# Patient Record
Sex: FEMALE | Race: AMERICAN INDIAN OR ALASKA NATIVE
[De-identification: names, ages, dates, MRNs, and addresses within clinical notes are randomized per-mention and may not be internally consistent; named-entity substitution may affect disease eponyms.]

---

## 2019-09-05 ENCOUNTER — HOSPITAL ENCOUNTER (OUTPATIENT)
Dept: HOSPITAL 5 - LAB | Age: 64
Discharge: HOME | End: 2019-09-05
Attending: NURSE PRACTITIONER
Payer: COMMERCIAL

## 2019-09-05 DIAGNOSIS — I10: Primary | ICD-10-CM

## 2019-09-05 LAB
ALBUMIN SERPL-MCNC: 4.4 G/DL (ref 3.9–5)
ALT SERPL-CCNC: 15 UNITS/L (ref 7–56)
BUN SERPL-MCNC: 32 MG/DL (ref 7–17)
BUN/CREAT SERPL: 32 %
CALCIUM SERPL-MCNC: 10.1 MG/DL (ref 8.4–10.2)
HCT VFR BLD CALC: 39.1 % (ref 30.3–42.9)
HDLC SERPL-MCNC: 50 MG/DL (ref 40–59)
HEMOLYSIS INDEX: 4
HGB BLD-MCNC: 12.7 GM/DL (ref 10.1–14.3)
MCHC RBC AUTO-ENTMCNC: 33 % (ref 30–34)
MCV RBC AUTO: 84 FL (ref 79–97)
PLATELET # BLD: 176 K/MM3 (ref 140–440)
RBC # BLD AUTO: 4.65 M/MM3 (ref 3.65–5.03)

## 2019-09-05 PROCEDURE — 84443 ASSAY THYROID STIM HORMONE: CPT

## 2019-09-05 PROCEDURE — 80053 COMPREHEN METABOLIC PANEL: CPT

## 2019-09-05 PROCEDURE — 85027 COMPLETE CBC AUTOMATED: CPT

## 2019-09-05 PROCEDURE — 36415 COLL VENOUS BLD VENIPUNCTURE: CPT

## 2019-09-05 PROCEDURE — 80061 LIPID PANEL: CPT

## 2019-09-16 ENCOUNTER — HOSPITAL ENCOUNTER (OUTPATIENT)
Dept: HOSPITAL 5 - CARD | Age: 64
Discharge: HOME | End: 2019-09-16
Attending: FAMILY MEDICINE
Payer: COMMERCIAL

## 2019-09-16 DIAGNOSIS — R00.1: ICD-10-CM

## 2019-09-16 DIAGNOSIS — I07.1: Primary | ICD-10-CM

## 2019-09-16 PROCEDURE — 93306 TTE W/DOPPLER COMPLETE: CPT

## 2019-09-16 PROCEDURE — 93017 CV STRESS TEST TRACING ONLY: CPT

## 2019-09-17 NOTE — TREADMILL REPORT
PROCEDURE:  Exercise treadmill test



The patient's resting EKG showed sinus bradycardia, rate 39 beats per minute,

voltage criteria for left ventricular hypertrophy, otherwise normal ECG.



 EXERCISE TEST:  The patient exercised for a total of 8 minutes and 37 seconds

on the Steven protocol achieving a maximum workload of 9.8 METS.



Test was terminated because of significant dizziness.  No chest pain.  The

patient was mildly dyspneic.



The patient's resting heart rate was 39 beats per minute with a peak heart rate

of 102 beats per minute, which was 64% of the predicted maximum heart rate.  The

patient's resting blood pressure was 145/58 with a peak blood pressure 169/69

mmHg.



The patient showed no significant ST-T abnormalities or arrhythmias at peak

exercise.



CONCLUSION:

1.  Incomplete stress test as the patient did not achieve the target heart rate.

2.  Good exercise capacity.

3.  Negative test for angina and ischemia within the limitations of the test.

4.  Suggest clinical correlation.  Consider Lexiscan or nuclear study if

indicated.





DD: 09/17/2019 11:51

DT: 09/17/2019 23:03

JOB# [id number]  [id number]

LEE ANN/HAYDEN

## 2020-10-14 ENCOUNTER — OFFICE VISIT (OUTPATIENT)
Dept: URBAN - METROPOLITAN AREA SURGERY CENTER 23 | Facility: SURGERY CENTER | Age: 65
End: 2020-10-14

## 2021-01-05 ENCOUNTER — OFFICE VISIT (OUTPATIENT)
Dept: URBAN - METROPOLITAN AREA CLINIC 118 | Facility: CLINIC | Age: 66
End: 2021-01-05
Payer: COMMERCIAL

## 2021-01-05 ENCOUNTER — LAB OUTSIDE AN ENCOUNTER (OUTPATIENT)
Dept: URBAN - METROPOLITAN AREA CLINIC 118 | Facility: CLINIC | Age: 66
End: 2021-01-05

## 2021-01-05 DIAGNOSIS — Z12.11 SCREEN FOR COLON CANCER: ICD-10-CM

## 2021-01-05 PROCEDURE — 99203 OFFICE O/P NEW LOW 30 MIN: CPT | Performed by: INTERNAL MEDICINE

## 2021-01-05 NOTE — HPI-TODAY'S VISIT:
pt h/o htn presents for colon cancer screening. Reports no nausea, vomiting, gerd or dysphagia. Denies LGI symptoms including diarrhea, constipation or rectal bleeding. pt has never had a colonoscopy.

## 2021-02-01 ENCOUNTER — OFFICE VISIT (OUTPATIENT)
Dept: URBAN - METROPOLITAN AREA SURGERY CENTER 23 | Facility: SURGERY CENTER | Age: 66
End: 2021-02-01

## 2021-02-01 ENCOUNTER — OFFICE VISIT (OUTPATIENT)
Dept: URBAN - METROPOLITAN AREA MEDICAL CENTER 16 | Facility: MEDICAL CENTER | Age: 66
End: 2021-02-01

## 2021-03-02 ENCOUNTER — OFFICE VISIT (OUTPATIENT)
Dept: URBAN - METROPOLITAN AREA MEDICAL CENTER 41 | Facility: MEDICAL CENTER | Age: 66
End: 2021-03-02

## 2021-07-13 ENCOUNTER — HOSPITAL ENCOUNTER (OUTPATIENT)
Dept: HOSPITAL 5 - GIO | Age: 66
Discharge: HOME | End: 2021-07-13
Attending: INTERNAL MEDICINE
Payer: COMMERCIAL

## 2021-07-13 ENCOUNTER — OFFICE VISIT (OUTPATIENT)
Dept: URBAN - METROPOLITAN AREA MEDICAL CENTER 41 | Facility: MEDICAL CENTER | Age: 66
End: 2021-07-13
Payer: COMMERCIAL

## 2021-07-13 VITALS — DIASTOLIC BLOOD PRESSURE: 64 MMHG | SYSTOLIC BLOOD PRESSURE: 158 MMHG

## 2021-07-13 DIAGNOSIS — K64.8: ICD-10-CM

## 2021-07-13 DIAGNOSIS — Z98.890: ICD-10-CM

## 2021-07-13 DIAGNOSIS — I10: ICD-10-CM

## 2021-07-13 DIAGNOSIS — Z12.11 COLON CANCER SCREENING: ICD-10-CM

## 2021-07-13 DIAGNOSIS — Z12.11: Primary | ICD-10-CM

## 2021-07-13 PROCEDURE — 45378 DIAGNOSTIC COLONOSCOPY: CPT | Performed by: INTERNAL MEDICINE

## 2021-07-13 PROCEDURE — 45378 DIAGNOSTIC COLONOSCOPY: CPT

## 2021-07-13 NOTE — ANESTHESIA CONSULTATION
Anesthesia Consult and Med Hx


Date of service: 07/13/21





- Airway


Anesthetic Teeth Evaluation: Good (uneven teeth)


ROM Head & Neck: Adequate


Mental/Hyoid Distance: Adequate


Mallampati Class: Class II


Intubation Access Assessment: Probably Good





- Pre-Operative Health Status


ASA Pre-Surgery Classification: ASA2


Proposed Anesthetic Plan: MAC





- Cardiovascular System


Hx Hypertension: Yes

## 2021-07-14 NOTE — HISTORY AND PHYSICAL REPORT
ADMIT DATE: 07/13/2021



INDICATION:  Colon cancer screening.



HISTORY OF PRESENT ILLNESS:  The patient is a 65-year-old female with no 

significant GI complaints, who presents for colon cancer screening.  She denies 

any specific GI problems or complaints at this time.



PAST MEDICAL HISTORY:  See chart.



MEDICATIONS:  See chart.



ALLERGIES:  No known drug allergies.



SOCIAL HISTORY:  Denies alcohol, tobacco or drug use.



FAMILY HISTORY:  ____ colon cancer, IBD, or liver disease.



REVIEW OF SYSTEMS:

GENERAL:  Benign.



PHYSICAL EXAMINATION:

VITALS:  Stable.

GENERAL:  Benign.



ASSESSMENT:  A 65-year-old female who presents for colon cancer screening.



PLAN:  Colonoscopy today.  Full H and P will be added to the chart.







DD: 07/13/2021 07:56 AM

DT: 07/13/2021 11:10 PM

TID: [id number] RECEIPT: [id number]

NIMISHA/TORRI/JAMIE

## 2021-12-10 ENCOUNTER — HOSPITAL ENCOUNTER (OUTPATIENT)
Dept: HOSPITAL 5 - MAMMO | Age: 66
Discharge: HOME | End: 2021-12-10
Attending: FAMILY MEDICINE
Payer: COMMERCIAL

## 2021-12-10 DIAGNOSIS — E78.5: ICD-10-CM

## 2021-12-10 DIAGNOSIS — Z12.31: Primary | ICD-10-CM

## 2021-12-10 DIAGNOSIS — I10: ICD-10-CM

## 2021-12-10 LAB
ALBUMIN SERPL-MCNC: 4.6 G/DL (ref 3.9–5)
ALT SERPL-CCNC: 16 UNITS/L (ref 7–56)
BUN SERPL-MCNC: 20 MG/DL (ref 7–17)
BUN/CREAT SERPL: 25 %
CALCIUM SERPL-MCNC: 9.7 MG/DL (ref 8.4–10.2)
CREATININE,URINE: 180.4 MG/DL (ref 0.1–20)
HCT VFR BLD CALC: 38.7 % (ref 30.3–42.9)
HDLC SERPL-MCNC: 62 MG/DL (ref 40–59)
HEMOLYSIS INDEX: 3
HGB BLD-MCNC: 11.9 GM/DL (ref 10.1–14.3)
MCHC RBC AUTO-ENTMCNC: 31 % (ref 30–34)
MCV RBC AUTO: 86 FL (ref 79–97)
PLATELET # BLD: 229 K/MM3 (ref 140–440)
RBC # BLD AUTO: 4.51 M/MM3 (ref 3.65–5.03)

## 2021-12-10 PROCEDURE — 77067 SCR MAMMO BI INCL CAD: CPT

## 2021-12-10 PROCEDURE — 80053 COMPREHEN METABOLIC PANEL: CPT

## 2021-12-10 PROCEDURE — 85027 COMPLETE CBC AUTOMATED: CPT

## 2021-12-10 PROCEDURE — 36415 COLL VENOUS BLD VENIPUNCTURE: CPT

## 2021-12-10 PROCEDURE — 82043 UR ALBUMIN QUANTITATIVE: CPT

## 2021-12-10 PROCEDURE — 80061 LIPID PANEL: CPT

## 2021-12-10 NOTE — MAMMOGRAPHY REPORT
DIGITAL SCREENING MAMMOGRAM WITH CAD, 12/10/2021



CLINICAL INFORMATION / INDICATION: Routine screening mammography.



TECHNIQUE:  Digital bilateral 2D mammography was obtained in the craniocaudal and mediolateral obliqu
e projections. This examination was interpreted with the benefit of Computer-Aided Detection analysis
.



COMPARISON: 10/27/2010



FINDINGS: 



Breast Density: There are scattered areas of fibroglandular density.



No dominant mass, suspicious calcifications, or architectural distortion in either breast. 



No interval change.

 

IMPRESSION: No mammographic evidence of malignancy.



Follow up recommendation: Routine yearly



BI-RADS Category 1:  Negative.





-------------------------------------------------------------------------------------------

A "normal" or negative report should not discourage follow up or biopsy of a clinically significant f
inding.



A written summary of these findings will be mailed to the patient. The patient will be entered into a
 mammography reporting system which will generate a reminder letter for the patient's next appointmen
t at the appropriate interval.



The [employer] recommends yearly mammograms starting at age 40 and continuing as l
will as a woman is in good health.  Breast MRI is recommended for women with an approximate 20-25% or 
greater lifetime risk of breast cancer, including women with a strong family history of breast or ova
elijah cancer or who have been treated for Hodgkin's disease.



Signer Name: Tiffanie Erickson MD 

Signed: 12/10/2021 5:47 PM

Workstation Name: [id number]

## 2022-02-10 ENCOUNTER — HOSPITAL ENCOUNTER (OUTPATIENT)
Dept: HOSPITAL 5 - LAB | Age: 67
Discharge: HOME | End: 2022-02-10
Attending: OBSTETRICS & GYNECOLOGY
Payer: COMMERCIAL

## 2022-02-10 DIAGNOSIS — F42.9: ICD-10-CM

## 2022-02-10 DIAGNOSIS — Z51.81: Primary | ICD-10-CM

## 2022-02-10 DIAGNOSIS — F20.0: ICD-10-CM

## 2022-02-10 DIAGNOSIS — F63.9: ICD-10-CM

## 2022-02-10 PROCEDURE — 36415 COLL VENOUS BLD VENIPUNCTURE: CPT

## 2022-04-26 ENCOUNTER — DASHBOARD ENCOUNTERS (OUTPATIENT)
Age: 67
End: 2022-04-26

## 2022-04-26 ENCOUNTER — OFFICE VISIT (OUTPATIENT)
Dept: URBAN - METROPOLITAN AREA CLINIC 118 | Facility: CLINIC | Age: 67
End: 2022-04-26
Payer: COMMERCIAL

## 2022-04-26 ENCOUNTER — LAB OUTSIDE AN ENCOUNTER (OUTPATIENT)
Dept: URBAN - METROPOLITAN AREA CLINIC 118 | Facility: CLINIC | Age: 67
End: 2022-04-26

## 2022-04-26 DIAGNOSIS — K21.9 GASTROESOPHAGEAL REFLUX DISEASE WITHOUT ESOPHAGITIS: ICD-10-CM

## 2022-04-26 DIAGNOSIS — R10.13 DYSPEPSIA: ICD-10-CM

## 2022-04-26 PROCEDURE — 99214 OFFICE O/P EST MOD 30 MIN: CPT | Performed by: INTERNAL MEDICINE

## 2022-04-26 NOTE — HPI-TODAY'S VISIT:
pt h/o gerd presents for GI evaluation. pt notes gerd worse over last 6 months. Denies nausea, vomiting or dysphagia. pt denies recent diet or med changes. No nsaids/asa. No LGI symptoms. pt seen by pmd, started on Protonix (taking qhs) and pepcid (taking in am) with relief. pt referred for further evaluation.

## 2022-05-23 PROBLEM — 266435005: Status: ACTIVE | Noted: 2022-04-26

## 2022-05-31 ENCOUNTER — OFFICE VISIT (OUTPATIENT)
Dept: URBAN - METROPOLITAN AREA MEDICAL CENTER 41 | Facility: MEDICAL CENTER | Age: 67
End: 2022-05-31
Payer: COMMERCIAL

## 2022-05-31 ENCOUNTER — HOSPITAL ENCOUNTER (OUTPATIENT)
Dept: HOSPITAL 5 - GIO | Age: 67
Discharge: HOME | End: 2022-05-31
Attending: INTERNAL MEDICINE
Payer: COMMERCIAL

## 2022-05-31 VITALS — DIASTOLIC BLOOD PRESSURE: 69 MMHG | SYSTOLIC BLOOD PRESSURE: 124 MMHG

## 2022-05-31 DIAGNOSIS — Z98.51: ICD-10-CM

## 2022-05-31 DIAGNOSIS — I10: ICD-10-CM

## 2022-05-31 DIAGNOSIS — K21.9: Primary | ICD-10-CM

## 2022-05-31 DIAGNOSIS — Z79.899: ICD-10-CM

## 2022-05-31 DIAGNOSIS — K44.9: ICD-10-CM

## 2022-05-31 DIAGNOSIS — Z98.890: ICD-10-CM

## 2022-05-31 DIAGNOSIS — K20.80 ESOPHAGITIS DISSECANS SUPERFICIALIS: ICD-10-CM

## 2022-05-31 DIAGNOSIS — K29.60 ADENOPAPILLOMATOSIS GASTRICA: ICD-10-CM

## 2022-05-31 DIAGNOSIS — K29.60: ICD-10-CM

## 2022-05-31 PROCEDURE — 43239 EGD BIOPSY SINGLE/MULTIPLE: CPT

## 2022-05-31 PROCEDURE — 88342 IMHCHEM/IMCYTCHM 1ST ANTB: CPT

## 2022-05-31 PROCEDURE — 43239 EGD BIOPSY SINGLE/MULTIPLE: CPT | Performed by: INTERNAL MEDICINE

## 2022-05-31 PROCEDURE — G8907 PT DOC NO EVENTS ON DISCHARG: HCPCS | Performed by: INTERNAL MEDICINE

## 2022-05-31 PROCEDURE — 88305 TISSUE EXAM BY PATHOLOGIST: CPT

## 2022-05-31 NOTE — ANESTHESIA CONSULTATION
Anesthesia Consult and Med Hx





- Airway


Anesthetic Teeth Evaluation: Good, Partials


ROM Head & Neck: Adequate


Mental/Hyoid Distance: Adequate


Mallampati Class: Class II


Intubation Access Assessment: Probably Good





- Pulmonary Exam


CTA: Yes





- Cardiac Exam


Cardiac Exam: RRR





- Pre-Operative Health Status


ASA Pre-Surgery Classification: ASA2


Proposed Anesthetic Plan: MAC





- Pulmonary


Hx Smoking: No


Hx Asthma: No


Hx Respiratory Symptoms: No


Hx Sleep Apnea: No





- Cardiovascular System


Hx Hypertension: Yes


Hx Heart Attack/AMI: No





- Central Nervous System


Hx Seizures: No


CVA: No





- Gastrointestinal


Hx Gastroesophageal Reflux Disease: Yes





- Endocrine


Hx Renal Disease: No


Hx Liver Disease: No


Hx Non-Insulin Dependent Diabetes: No


Hx Thyroid Disease: No





- Hematic


Hx Anemia: No


Hx Sickle Cell Disease: No





- Other Systems


Hx Alcohol Use: No


Hx Substance Use: No


Hx Cancer: No


Hx Obesity: No





- Additional Comments


Anesthesia Medical History Comments: no hx of anesthesia complications

## 2022-05-31 NOTE — OPERATIVE REPORT
DATE OF SURGERY: 05/31/2022



INDICATIONS:

1.  GERD.

2.  Dyspepsia.



MEDICATIONS:  Propofol per CRNA.



COMPLICATIONS:  None.



DESCRIPTION OF PROCEDURE:  The patient was brought to the procedure suite.  The 

patient had the procedure discussed with her at length.  All risks, 

complications, and benefits discussed, after which the patient signed for the 

procedure to be performed.  The patient was placed in the left lateral decubitus

position.  Mouth block placed in the patient's oral cavity.  After adequate 

sedation and medications above, endoscope placed in the mouth and brought to the

level of the second portion of duodenum.  Retroflexion view performed.  The 

patient's vital signs remained stable throughout the procedure.



FINDINGS:  There is an irregular Z line, probably from acid reflux noted 34 cm 

from the gums.  Biopsies were taken and sent for pathology.  There is a 2.5 cm 

hiatal hernia at GE junction.  The esophagus otherwise appeared to be normal.  

There is mild antral gastritis noted.  Biopsies were taken and sent to 

pathology.  Remaining stomach otherwise appeared to be normal.  Duodenum 

appeared to be normal.  Retroflexion view performed in the stomach showed no 

other pathology other than noted above.  The patient tolerated the procedure 

well.  No complications noted in procedure.



IMPRESSION:

1.  Hiatal hernia.

2.  Irregular Z line, biopsies performed.

3.  Otherwise, normal esophagus.

4.  Gastritis with biopsies performed.

5.  Otherwise, normal stomach.

6.  Normal duodenum.



RECOMMENDATIONS:

1.  Follow up biopsy results.

2.  If H. pylori positive, will treat.

3.  PPI daily.

4.  Follow up in clinic in 6-12 weeks.







DD: 05/31/2022 08:04 AM

DT: 05/31/2022 08:14 AM

TID: [id number] RECEIPT: [id number]

[facility]/GENNY

## 2022-06-03 ENCOUNTER — OFFICE VISIT (OUTPATIENT)
Dept: URBAN - METROPOLITAN AREA MEDICAL CENTER 41 | Facility: MEDICAL CENTER | Age: 67
End: 2022-06-03